# Patient Record
Sex: FEMALE | Race: WHITE | Employment: FULL TIME | URBAN - METROPOLITAN AREA
[De-identification: names, ages, dates, MRNs, and addresses within clinical notes are randomized per-mention and may not be internally consistent; named-entity substitution may affect disease eponyms.]

---

## 2023-11-27 ASSESSMENT — SOCIAL DETERMINANTS OF HEALTH (SDOH)
WITHIN THE LAST YEAR, HAVE YOU BEEN HUMILIATED OR EMOTIONALLY ABUSED IN OTHER WAYS BY YOUR PARTNER OR EX-PARTNER?: NO
WITHIN THE LAST YEAR, HAVE YOU BEEN AFRAID OF YOUR PARTNER OR EX-PARTNER?: NO
WITHIN THE LAST YEAR, HAVE TO BEEN RAPED OR FORCED TO HAVE ANY KIND OF SEXUAL ACTIVITY BY YOUR PARTNER OR EX-PARTNER?: NO
WITHIN THE LAST YEAR, HAVE YOU BEEN KICKED, HIT, SLAPPED, OR OTHERWISE PHYSICALLY HURT BY YOUR PARTNER OR EX-PARTNER?: NO

## 2023-11-29 ENCOUNTER — HOSPITAL ENCOUNTER (OUTPATIENT)
Age: 38
Setting detail: SPECIMEN
Discharge: HOME | End: 2023-11-29

## 2023-11-29 ENCOUNTER — OFFICE VISIT (OUTPATIENT)
Dept: OBGYN CLINIC | Age: 38
End: 2023-11-29
Payer: MEDICAID

## 2023-11-29 VITALS
WEIGHT: 196.8 LBS | SYSTOLIC BLOOD PRESSURE: 120 MMHG | DIASTOLIC BLOOD PRESSURE: 70 MMHG | BODY MASS INDEX: 32.79 KG/M2 | HEIGHT: 65 IN

## 2023-11-29 DIAGNOSIS — N93.9 ABNORMAL UTERINE AND VAGINAL BLEEDING, UNSPECIFIED: ICD-10-CM

## 2023-11-29 DIAGNOSIS — N93.9 ABNORMAL UTERINE AND VAGINAL BLEEDING, UNSPECIFIED: Primary | ICD-10-CM

## 2023-11-29 LAB
BASOPHILS # BLD: 0.06 K/UL (ref 0–0.2)
BASOPHILS NFR BLD: 1 % (ref 0–2)
EOSINOPHIL # BLD: 0.13 K/UL (ref 0–0.44)
EOSINOPHILS RELATIVE PERCENT: 2 % (ref 1–4)
ERYTHROCYTE [DISTWIDTH] IN BLOOD BY AUTOMATED COUNT: 12.2 % (ref 11.8–14.4)
HCT VFR BLD AUTO: 36.6 % (ref 36.3–47.1)
HGB BLD-MCNC: 11.7 G/DL (ref 11.9–15.1)
IMM GRANULOCYTES # BLD AUTO: <0.03 K/UL (ref 0–0.3)
IMM GRANULOCYTES NFR BLD: 0 %
IRON SATN MFR SERPL: 15 % (ref 20–55)
IRON SERPL-MCNC: 50 UG/DL (ref 37–145)
LYMPHOCYTES NFR BLD: 1.56 K/UL (ref 1.1–3.7)
LYMPHOCYTES RELATIVE PERCENT: 20 % (ref 24–43)
MCH RBC QN AUTO: 31.1 PG (ref 25.2–33.5)
MCHC RBC AUTO-ENTMCNC: 32 G/DL (ref 28.4–34.8)
MCV RBC AUTO: 97.3 FL (ref 82.6–102.9)
MONOCYTES NFR BLD: 0.66 K/UL (ref 0.1–1.2)
MONOCYTES NFR BLD: 9 % (ref 3–12)
NEUTROPHILS NFR BLD: 68 % (ref 36–65)
NEUTS SEG NFR BLD: 5.38 K/UL (ref 1.5–8.1)
NRBC BLD-RTO: 0 PER 100 WBC
PLATELET # BLD AUTO: 482 K/UL (ref 138–453)
PMV BLD AUTO: 9.5 FL (ref 8.1–13.5)
RBC # BLD AUTO: 3.76 M/UL (ref 3.95–5.11)
T4 FREE SERPL-MCNC: 1.1 NG/DL (ref 0.9–1.7)
TIBC SERPL-MCNC: 341 UG/DL (ref 250–450)
TSH SERPL DL<=0.05 MIU/L-ACNC: 1.27 UIU/ML (ref 0.3–5)
UNSATURATED IRON BINDING CAPACITY: 291 UG/DL (ref 112–347)
WBC OTHER # BLD: 7.8 K/UL (ref 3.5–11.3)

## 2023-11-29 PROCEDURE — 99204 OFFICE O/P NEW MOD 45 MIN: CPT | Performed by: NURSE PRACTITIONER

## 2023-11-29 RX ORDER — ESCITALOPRAM OXALATE 20 MG/1
TABLET ORAL
COMMUNITY
Start: 2023-11-22

## 2023-11-29 RX ORDER — LOSARTAN POTASSIUM 100 MG/1
TABLET ORAL
COMMUNITY
Start: 2023-11-27

## 2023-11-29 RX ORDER — BUPROPION HYDROCHLORIDE 300 MG/1
TABLET ORAL
COMMUNITY
Start: 2023-11-01

## 2023-11-29 RX ORDER — TOPIRAMATE 100 MG/1
TABLET, FILM COATED ORAL
COMMUNITY
Start: 2023-11-27

## 2023-11-29 RX ORDER — CETIRIZINE HYDROCHLORIDE 10 MG/1
1 TABLET ORAL DAILY
COMMUNITY
Start: 2022-11-07

## 2023-11-29 NOTE — PROGRESS NOTES
PROBLEM VISIT     Date of service: 2023    Desiree Novoa Every  Is a 34 y.o. female    PT's PCP is: Dora Alva DO     : 1985                                             Subjective:       Patient's last menstrual period was 2023. OB History   No obstetric history on file. Social History     Tobacco Use   Smoking Status Never   Smokeless Tobacco Not on file        Social History     Substance and Sexual Activity   Alcohol Use Yes    Comment: very little        Allergies: Latex, Influenza vaccines, Other, and Tetanus toxoids      Current Outpatient Medications:     tranexamic acid (LYSTEDA) 650 MG TABS tablet, Take 2 tablets by mouth 3 times daily, Disp: 18 tablet, Rfl: 0    cetirizine (ZYRTEC) 10 MG tablet, Take 1 tablet by mouth daily, Disp: , Rfl:     PHENTERMINE HCL PO, , Disp: , Rfl:     topiramate (TOPAMAX) 100 MG tablet, , Disp: , Rfl:     losartan (COZAAR) 100 MG tablet, , Disp: , Rfl:     escitalopram (LEXAPRO) 20 MG tablet, , Disp: , Rfl:     buPROPion (WELLBUTRIN XL) 300 MG extended release tablet, , Disp: , Rfl:     Social History     Substance and Sexual Activity   Sexual Activity Yes    Partners: Female         Chief Complaint   Patient presents with    Menstrual Problem     Reports bleeding since  and most days are heavy with clotting. Reports some clots are small and some are golf ball size or larger. Pelvic Pain     Reports increased cramping with this cycle. PE:  Vital Signs  Blood pressure 120/70, height 1.651 m (5' 5\"), weight 89.3 kg (196 lb 12.8 oz), last menstrual period 2023. HPI: Patient presents today with complaints of AUB. Reports onset of vaginal bleeding on 23 which was normal timing for LMP. Bleeding has persisted and been heavy in amount with clotting. Prior to AUB menses were monthly lasting appropriate duration. Denies any recent medication changes; down 30 pounds since May with Adipex.  Has gastric bypass 5 years ago

## 2023-11-30 RX ORDER — TRANEXAMIC ACID 650 MG/1
1300 TABLET ORAL 3 TIMES DAILY
Qty: 18 TABLET | Refills: 0 | Status: SHIPPED | OUTPATIENT
Start: 2023-11-30

## 2023-12-06 ASSESSMENT — ENCOUNTER SYMPTOMS
DIARRHEA: 0
CONSTIPATION: 0
SHORTNESS OF BREATH: 0
ABDOMINAL PAIN: 0

## 2023-12-07 ENCOUNTER — OFFICE VISIT (OUTPATIENT)
Dept: OBGYN CLINIC | Age: 38
End: 2023-12-07
Payer: MEDICAID

## 2023-12-07 VITALS — SYSTOLIC BLOOD PRESSURE: 124 MMHG | BODY MASS INDEX: 32.78 KG/M2 | WEIGHT: 197 LBS | DIASTOLIC BLOOD PRESSURE: 78 MMHG

## 2023-12-07 DIAGNOSIS — N93.9 ABNORMAL UTERINE AND VAGINAL BLEEDING, UNSPECIFIED: Primary | ICD-10-CM

## 2023-12-07 DIAGNOSIS — D25.9 UTERINE LEIOMYOMA, UNSPECIFIED LOCATION: ICD-10-CM

## 2023-12-07 DIAGNOSIS — N84.0 ENDOMETRIAL POLYP: ICD-10-CM

## 2023-12-07 DIAGNOSIS — N80.03 ADENOMYOSIS: ICD-10-CM

## 2023-12-07 PROCEDURE — 99213 OFFICE O/P EST LOW 20 MIN: CPT | Performed by: NURSE PRACTITIONER

## 2023-12-07 NOTE — PROGRESS NOTES
PROBLEM VISIT     Date of service: 2023    Pam Escobedo  Is a 34 y.o. female    PT's PCP is: Matt Dewitt DO     : 1985                                             Subjective:       Patient's last menstrual period was 2023. OB History   No obstetric history on file. Social History     Tobacco Use   Smoking Status Never   Smokeless Tobacco Not on file        Social History     Substance and Sexual Activity   Alcohol Use Yes    Comment: very little        Allergies: Latex, Influenza vaccines, Other, and Tetanus toxoids      Current Outpatient Medications:     tranexamic acid (LYSTEDA) 650 MG TABS tablet, Take 2 tablets by mouth 3 times daily, Disp: 18 tablet, Rfl: 0    topiramate (TOPAMAX) 100 MG tablet, , Disp: , Rfl:     losartan (COZAAR) 100 MG tablet, , Disp: , Rfl:     escitalopram (LEXAPRO) 20 MG tablet, , Disp: , Rfl:     buPROPion (WELLBUTRIN XL) 300 MG extended release tablet, , Disp: , Rfl:     cetirizine (ZYRTEC) 10 MG tablet, Take 1 tablet by mouth daily, Disp: , Rfl:     PHENTERMINE HCL PO, , Disp: , Rfl:     Social History     Substance and Sexual Activity   Sexual Activity Yes    Partners: Female         Chief Complaint   Patient presents with    Follow-up     Follow up usn for AUB. Stopped bleeding yesterday. PE:  Vital Signs  Blood pressure 124/78, weight 89.4 kg (197 lb), last menstrual period 2023. HPI: Patient presents today following ultrasound for AUB. Patient presents today with complaints of AUB. Reports onset of vaginal bleeding on 23 which was normal timing for LMP. Bleeding has persisted and been heavy in amount with clotting. Prior to AUB menses were monthly lasting appropriate duration. Bleeding lightened with Lysteda but did not stop until yesterday . PT denies fever, chills, nausea and vomiting       Review of Systems   Constitutional: Negative. Respiratory:  Negative for chest tightness and shortness of breath.

## 2023-12-13 ASSESSMENT — ENCOUNTER SYMPTOMS
ABDOMINAL PAIN: 0
CHEST TIGHTNESS: 0
SHORTNESS OF BREATH: 0

## 2023-12-30 DIAGNOSIS — N93.9 ABNORMAL UTERINE AND VAGINAL BLEEDING, UNSPECIFIED: ICD-10-CM

## 2024-01-02 RX ORDER — TRANEXAMIC ACID 650 MG/1
1300 TABLET ORAL 3 TIMES DAILY
Qty: 18 TABLET | Refills: 0 | Status: SHIPPED | OUTPATIENT
Start: 2024-01-02

## 2024-02-12 NOTE — PROGRESS NOTES
Patient instructed on the pre-operative, intra-operative, and post-operative process. Patient instructed on NPO status. Medication instructions and pre operative instruction sheet reviewed with the patient. CHG skin prep instructions reviewed with patient. Instructed pt to stop taking phentermine today and to take topamax, wellbutrin, lexapro, and zyrtec with a small sip of water prior to arriving to the hospital the day of surgery.

## 2024-02-21 ENCOUNTER — HOSPITAL ENCOUNTER (OUTPATIENT)
Age: 39
Setting detail: SPECIMEN
Discharge: HOME | End: 2024-02-21

## 2024-02-21 ENCOUNTER — OFFICE VISIT (OUTPATIENT)
Dept: OBGYN CLINIC | Age: 39
End: 2024-02-21
Payer: MEDICAID

## 2024-02-21 VITALS — DIASTOLIC BLOOD PRESSURE: 82 MMHG | SYSTOLIC BLOOD PRESSURE: 132 MMHG | WEIGHT: 199 LBS | BODY MASS INDEX: 33.12 KG/M2

## 2024-02-21 DIAGNOSIS — N80.03 ADENOMYOSIS: ICD-10-CM

## 2024-02-21 DIAGNOSIS — D21.9 FIBROIDS: ICD-10-CM

## 2024-02-21 DIAGNOSIS — N84.0 POLYP OF CORPUS UTERI: ICD-10-CM

## 2024-02-21 DIAGNOSIS — N84.0 ENDOMETRIAL POLYP: ICD-10-CM

## 2024-02-21 DIAGNOSIS — Z01.812 ENCOUNTER FOR PRE-OPERATIVE LABORATORY TESTING: ICD-10-CM

## 2024-02-21 DIAGNOSIS — D25.9 UTERINE LEIOMYOMA, UNSPECIFIED LOCATION: ICD-10-CM

## 2024-02-21 DIAGNOSIS — N93.9 ABNORMAL UTERINE AND VAGINAL BLEEDING, UNSPECIFIED: ICD-10-CM

## 2024-02-21 DIAGNOSIS — I10 ESSENTIAL HYPERTENSION, BENIGN: ICD-10-CM

## 2024-02-21 DIAGNOSIS — N92.1 MENORRHAGIA WITH IRREGULAR CYCLE: Primary | ICD-10-CM

## 2024-02-21 LAB
ALBUMIN SERPL-MCNC: 4 G/DL (ref 3.5–5.2)
ALBUMIN/GLOB SERPL: 2 {RATIO} (ref 1–2.5)
ALP SERPL-CCNC: 84 U/L (ref 35–104)
ALT SERPL-CCNC: 12 U/L (ref 10–35)
ANION GAP SERPL CALCULATED.3IONS-SCNC: 9 MMOL/L (ref 9–16)
AST SERPL-CCNC: 22 U/L (ref 10–35)
BASOPHILS # BLD: 0.07 K/UL (ref 0–0.2)
BASOPHILS NFR BLD: 1 % (ref 0–2)
BILIRUB SERPL-MCNC: 0.3 MG/DL (ref 0–1.2)
BUN SERPL-MCNC: 13 MG/DL (ref 6–20)
CALCIUM SERPL-MCNC: 9 MG/DL (ref 8.6–10.4)
CHLORIDE SERPL-SCNC: 106 MMOL/L (ref 98–107)
CO2 SERPL-SCNC: 24 MMOL/L (ref 20–31)
CREAT SERPL-MCNC: 0.8 MG/DL (ref 0.5–0.9)
EOSINOPHIL # BLD: 0.25 K/UL (ref 0–0.44)
EOSINOPHILS RELATIVE PERCENT: 4 % (ref 1–4)
ERYTHROCYTE [DISTWIDTH] IN BLOOD BY AUTOMATED COUNT: 12.2 % (ref 11.8–14.4)
GFR SERPL CREATININE-BSD FRML MDRD: >60 ML/MIN/1.73M2
GLUCOSE SERPL-MCNC: 98 MG/DL (ref 74–99)
HCG SERPL QL: NEGATIVE
HCT VFR BLD AUTO: 31.1 % (ref 36.3–47.1)
HGB BLD-MCNC: 9.2 G/DL (ref 11.9–15.1)
IMM GRANULOCYTES # BLD AUTO: <0.03 K/UL (ref 0–0.3)
IMM GRANULOCYTES NFR BLD: 0 %
LYMPHOCYTES NFR BLD: 1.6 K/UL (ref 1.1–3.7)
LYMPHOCYTES RELATIVE PERCENT: 23 % (ref 24–43)
MCH RBC QN AUTO: 27.1 PG (ref 25.2–33.5)
MCHC RBC AUTO-ENTMCNC: 29.6 G/DL (ref 28.4–34.8)
MCV RBC AUTO: 91.5 FL (ref 82.6–102.9)
MONOCYTES NFR BLD: 0.65 K/UL (ref 0.1–1.2)
MONOCYTES NFR BLD: 9 % (ref 3–12)
NEUTROPHILS NFR BLD: 63 % (ref 36–65)
NEUTS SEG NFR BLD: 4.45 K/UL (ref 1.5–8.1)
NRBC BLD-RTO: 0 PER 100 WBC
PLATELET # BLD AUTO: 513 K/UL (ref 138–453)
PMV BLD AUTO: 9.8 FL (ref 8.1–13.5)
POTASSIUM SERPL-SCNC: 3.9 MMOL/L (ref 3.7–5.3)
PROT SERPL-MCNC: 6.5 G/DL (ref 6.6–8.7)
RBC # BLD AUTO: 3.4 M/UL (ref 3.95–5.11)
SODIUM SERPL-SCNC: 139 MMOL/L (ref 136–145)
WBC OTHER # BLD: 7 K/UL (ref 3.5–11.3)

## 2024-02-21 PROCEDURE — 99213 OFFICE O/P EST LOW 20 MIN: CPT | Performed by: OBSTETRICS & GYNECOLOGY

## 2024-02-21 NOTE — PROGRESS NOTES
DATE OF VISIT:  2/21/24    PATIENT NAME:  Desiree Garcia     YOB: 1985    REASON FOR VISIT:    Chief Complaint   Patient presents with    Menorrhagia     Adenomyosis, endometrial polyp, fibroids. Patient was originally scheduled for annual today, she reports she just had one at [facility] in 7- with a nl pap at that time.  Patient reports current heavy bleeding. She is saturating a pad every 1-1/2 hrs.  Passing quarter sized clots.  She does have a lysteda rx at home that she used once in the past. Cycles have been irregular since oct/nov.        HISTORY OF PRESENT ILLNESS:  Pt with complaint of heavy and prolonged menses; abnl bleeding started in November and she bled almost daily until appt in dec; lysteda slowed the bleeding but didn't stopped it - she only used once since December; she has had gastric bypass and has had anemia; usn showed: Heterogeneous appearing retroverted uterus measures: 8.1 x 6.4  x 4.9 cm; Endometrium measures: 8.5 mm; Hyperechoic area with a feeding vessel is seen within the endometrial canal milena: 1.1 x 0.6 x 0.9 cm; Multiple fibroids seen; largest three milena:   #1: Right uterus milena: 2.1 x 1.9 x 1.9 cm,     #2: Near uterine fundus milena: 1.6 x 1.5 x 1.8 cm,  #3: Left uterus milena: 1.4 x 1.0 x 1.4 cm; pt interested in definitive surgical intervention; disc'd ra tlh/poss bso; r/b/a reviewed; restrictions and recovery disc'd          Patient's last menstrual period was 02/20/2024 (exact date).  Vitals:    02/21/24 1028   BP: 132/82   Site: Right Upper Arm   Position: Sitting   Weight: 90.3 kg (199 lb)     Body mass index is 33.12 kg/m².  Allergies   Allergen Reactions    Latex     Influenza Vaccines     Other      \"Flu Shot\"    Tetanus Toxoids      Current Outpatient Medications   Medication Sig Dispense Refill    topiramate (TOPAMAX) 100 MG tablet       losartan (COZAAR) 100 MG tablet       escitalopram (LEXAPRO) 20 MG tablet       buPROPion (WELLBUTRIN XL) 300

## 2024-02-21 NOTE — H&P (VIEW-ONLY)
DATE OF VISIT:  2/21/24    PATIENT NAME:  Desiree Garcia     YOB: 1985    REASON FOR VISIT:    Chief Complaint   Patient presents with    Menorrhagia     Adenomyosis, endometrial polyp, fibroids. Patient was originally scheduled for annual today, she reports she just had one at [facility] in 7- with a nl pap at that time.  Patient reports current heavy bleeding. She is saturating a pad every 1-1/2 hrs.  Passing quarter sized clots.  She does have a lysteda rx at home that she used once in the past. Cycles have been irregular since oct/nov.        HISTORY OF PRESENT ILLNESS:  Pt with complaint of heavy and prolonged menses; abnl bleeding started in November and she bled almost daily until appt in dec; lysteda slowed the bleeding but didn't stopped it - she only used once since December; she has had gastric bypass and has had anemia; usn showed: Heterogeneous appearing retroverted uterus measures: 8.1 x 6.4  x 4.9 cm; Endometrium measures: 8.5 mm; Hyperechoic area with a feeding vessel is seen within the endometrial canal milena: 1.1 x 0.6 x 0.9 cm; Multiple fibroids seen; largest three milena:   #1: Right uterus milena: 2.1 x 1.9 x 1.9 cm,     #2: Near uterine fundus milena: 1.6 x 1.5 x 1.8 cm,  #3: Left uterus milena: 1.4 x 1.0 x 1.4 cm; pt interested in definitive surgical intervention; disc'd ra tlh/poss bso; r/b/a reviewed; restrictions and recovery disc'd          Patient's last menstrual period was 02/20/2024 (exact date).  Vitals:    02/21/24 1028   BP: 132/82   Site: Right Upper Arm   Position: Sitting   Weight: 90.3 kg (199 lb)     Body mass index is 33.12 kg/m².  Allergies   Allergen Reactions    Latex     Influenza Vaccines     Other      \"Flu Shot\"    Tetanus Toxoids      Current Outpatient Medications   Medication Sig Dispense Refill    topiramate (TOPAMAX) 100 MG tablet       losartan (COZAAR) 100 MG tablet       escitalopram (LEXAPRO) 20 MG tablet       buPROPion (WELLBUTRIN XL) 300

## 2024-02-22 RX ORDER — FERROUS SULFATE 325(65) MG
325 TABLET ORAL 2 TIMES DAILY
Qty: 180 TABLET | Refills: 1 | Status: SHIPPED | OUTPATIENT
Start: 2024-02-22

## 2024-02-23 ENCOUNTER — ANESTHESIA EVENT (OUTPATIENT)
Dept: OPERATING ROOM | Age: 39
End: 2024-02-23
Payer: MEDICAID

## 2024-02-26 ENCOUNTER — ANESTHESIA (OUTPATIENT)
Dept: OPERATING ROOM | Age: 39
End: 2024-02-26
Payer: MEDICAID

## 2024-02-26 ENCOUNTER — HOSPITAL ENCOUNTER (OUTPATIENT)
Age: 39
Setting detail: OUTPATIENT SURGERY
Discharge: HOME | End: 2024-02-26
Attending: OBSTETRICS & GYNECOLOGY | Admitting: OBSTETRICS & GYNECOLOGY
Payer: MEDICAID

## 2024-02-26 VITALS
DIASTOLIC BLOOD PRESSURE: 76 MMHG | HEIGHT: 65 IN | BODY MASS INDEX: 33.26 KG/M2 | HEART RATE: 70 BPM | TEMPERATURE: 97 F | OXYGEN SATURATION: 97 % | SYSTOLIC BLOOD PRESSURE: 122 MMHG | WEIGHT: 199.6 LBS | RESPIRATION RATE: 20 BRPM

## 2024-02-26 DIAGNOSIS — N84.0 ENDOMETRIAL POLYP: ICD-10-CM

## 2024-02-26 DIAGNOSIS — N80.03 ADENOMYOSIS: ICD-10-CM

## 2024-02-26 DIAGNOSIS — G89.18 POSTOPERATIVE PAIN: Primary | ICD-10-CM

## 2024-02-26 PROBLEM — N92.1 EXCESSIVE AND FREQUENT MENSTRUATION WITH IRREGULAR CYCLE: Status: ACTIVE | Noted: 2024-02-26

## 2024-02-26 PROBLEM — D25.9 UTERINE LEIOMYOMA: Status: ACTIVE | Noted: 2024-02-26

## 2024-02-26 PROCEDURE — 6360000002 HC RX W HCPCS

## 2024-02-26 PROCEDURE — 7100000001 HC PACU RECOVERY - ADDTL 15 MIN: Performed by: OBSTETRICS & GYNECOLOGY

## 2024-02-26 PROCEDURE — 64488 TAP BLOCK BI INJECTION: CPT | Performed by: NURSE ANESTHETIST, CERTIFIED REGISTERED

## 2024-02-26 PROCEDURE — 7100000000 HC PACU RECOVERY - FIRST 15 MIN: Performed by: OBSTETRICS & GYNECOLOGY

## 2024-02-26 PROCEDURE — 58571 TLH W/T/O 250 G OR LESS: CPT | Performed by: OBSTETRICS & GYNECOLOGY

## 2024-02-26 PROCEDURE — 2580000003 HC RX 258: Performed by: NURSE ANESTHETIST, CERTIFIED REGISTERED

## 2024-02-26 PROCEDURE — 3600000009 HC SURGERY ROBOT BASE: Performed by: OBSTETRICS & GYNECOLOGY

## 2024-02-26 PROCEDURE — 88307 TISSUE EXAM BY PATHOLOGIST: CPT

## 2024-02-26 PROCEDURE — A4216 STERILE WATER/SALINE, 10 ML: HCPCS | Performed by: NURSE ANESTHETIST, CERTIFIED REGISTERED

## 2024-02-26 PROCEDURE — 7100000011 HC PHASE II RECOVERY - ADDTL 15 MIN: Performed by: OBSTETRICS & GYNECOLOGY

## 2024-02-26 PROCEDURE — 2500000003 HC RX 250 WO HCPCS: Performed by: NURSE ANESTHETIST, CERTIFIED REGISTERED

## 2024-02-26 PROCEDURE — 6360000002 HC RX W HCPCS: Performed by: NURSE ANESTHETIST, CERTIFIED REGISTERED

## 2024-02-26 PROCEDURE — 2709999900 HC NON-CHARGEABLE SUPPLY: Performed by: OBSTETRICS & GYNECOLOGY

## 2024-02-26 PROCEDURE — 3700000000 HC ANESTHESIA ATTENDED CARE: Performed by: OBSTETRICS & GYNECOLOGY

## 2024-02-26 PROCEDURE — 3700000001 HC ADD 15 MINUTES (ANESTHESIA): Performed by: OBSTETRICS & GYNECOLOGY

## 2024-02-26 PROCEDURE — 7100000010 HC PHASE II RECOVERY - FIRST 15 MIN: Performed by: OBSTETRICS & GYNECOLOGY

## 2024-02-26 PROCEDURE — 3600000019 HC SURGERY ROBOT ADDTL 15MIN: Performed by: OBSTETRICS & GYNECOLOGY

## 2024-02-26 PROCEDURE — 58571 TLH W/T/O 250 G OR LESS: CPT

## 2024-02-26 PROCEDURE — 6370000000 HC RX 637 (ALT 250 FOR IP): Performed by: NURSE ANESTHETIST, CERTIFIED REGISTERED

## 2024-02-26 PROCEDURE — 6370000000 HC RX 637 (ALT 250 FOR IP)

## 2024-02-26 PROCEDURE — S2900 ROBOTIC SURGICAL SYSTEM: HCPCS | Performed by: OBSTETRICS & GYNECOLOGY

## 2024-02-26 RX ORDER — DIMENHYDRINATE 50 MG
50 TABLET ORAL ONCE
Status: COMPLETED | OUTPATIENT
Start: 2024-02-26 | End: 2024-02-26

## 2024-02-26 RX ORDER — SODIUM CHLORIDE 9 MG/ML
INJECTION, SOLUTION INTRAVENOUS PRN
Status: DISCONTINUED | OUTPATIENT
Start: 2024-02-26 | End: 2024-02-26 | Stop reason: HOSPADM

## 2024-02-26 RX ORDER — SODIUM CHLORIDE, SODIUM LACTATE, POTASSIUM CHLORIDE, CALCIUM CHLORIDE 600; 310; 30; 20 MG/100ML; MG/100ML; MG/100ML; MG/100ML
INJECTION, SOLUTION INTRAVENOUS
Status: COMPLETED | OUTPATIENT
Start: 2024-02-26 | End: 2024-02-26

## 2024-02-26 RX ORDER — ROCURONIUM BROMIDE 10 MG/ML
INJECTION, SOLUTION INTRAVENOUS PRN
Status: DISCONTINUED | OUTPATIENT
Start: 2024-02-26 | End: 2024-02-26 | Stop reason: SDUPTHER

## 2024-02-26 RX ORDER — DEXAMETHASONE SODIUM PHOSPHATE 10 MG/ML
INJECTION, SOLUTION INTRAMUSCULAR; INTRAVENOUS PRN
Status: DISCONTINUED | OUTPATIENT
Start: 2024-02-26 | End: 2024-02-26 | Stop reason: SDUPTHER

## 2024-02-26 RX ORDER — HYDROCODONE BITARTRATE AND ACETAMINOPHEN 5; 325 MG/1; MG/1
1 TABLET ORAL EVERY 6 HOURS PRN
Qty: 10 TABLET | Refills: 0 | Status: SHIPPED | OUTPATIENT
Start: 2024-02-26 | End: 2024-03-02

## 2024-02-26 RX ORDER — KETOROLAC TROMETHAMINE 10 MG/1
10 TABLET, FILM COATED ORAL EVERY 6 HOURS PRN
Qty: 20 TABLET | Refills: 0 | Status: SHIPPED | OUTPATIENT
Start: 2024-02-26 | End: 2025-02-25

## 2024-02-26 RX ORDER — NEOSTIGMINE METHYLSULFATE 1 MG/ML
INJECTION, SOLUTION INTRAVENOUS PRN
Status: DISCONTINUED | OUTPATIENT
Start: 2024-02-26 | End: 2024-02-26 | Stop reason: SDUPTHER

## 2024-02-26 RX ORDER — GLYCOPYRROLATE 0.2 MG/ML
INJECTION INTRAMUSCULAR; INTRAVENOUS PRN
Status: DISCONTINUED | OUTPATIENT
Start: 2024-02-26 | End: 2024-02-26 | Stop reason: SDUPTHER

## 2024-02-26 RX ORDER — SODIUM CHLORIDE, SODIUM LACTATE, POTASSIUM CHLORIDE, CALCIUM CHLORIDE 600; 310; 30; 20 MG/100ML; MG/100ML; MG/100ML; MG/100ML
INJECTION, SOLUTION INTRAVENOUS CONTINUOUS
Status: DISCONTINUED | OUTPATIENT
Start: 2024-02-26 | End: 2024-02-26 | Stop reason: HOSPADM

## 2024-02-26 RX ORDER — ACETAMINOPHEN 325 MG/1
650 TABLET ORAL ONCE
Status: COMPLETED | OUTPATIENT
Start: 2024-02-26 | End: 2024-02-26

## 2024-02-26 RX ORDER — ONDANSETRON 2 MG/ML
4 INJECTION INTRAMUSCULAR; INTRAVENOUS
Status: DISCONTINUED | OUTPATIENT
Start: 2024-02-26 | End: 2024-02-26 | Stop reason: HOSPADM

## 2024-02-26 RX ORDER — PROPOFOL 10 MG/ML
INJECTION, EMULSION INTRAVENOUS PRN
Status: DISCONTINUED | OUTPATIENT
Start: 2024-02-26 | End: 2024-02-26 | Stop reason: SDUPTHER

## 2024-02-26 RX ORDER — SODIUM CHLORIDE 0.9 % (FLUSH) 0.9 %
5-40 SYRINGE (ML) INJECTION PRN
Status: DISCONTINUED | OUTPATIENT
Start: 2024-02-26 | End: 2024-02-26 | Stop reason: HOSPADM

## 2024-02-26 RX ORDER — LIDOCAINE HYDROCHLORIDE 20 MG/ML
INJECTION, SOLUTION EPIDURAL; INFILTRATION; INTRACAUDAL; PERINEURAL PRN
Status: DISCONTINUED | OUTPATIENT
Start: 2024-02-26 | End: 2024-02-26 | Stop reason: SDUPTHER

## 2024-02-26 RX ORDER — ONDANSETRON 2 MG/ML
INJECTION INTRAMUSCULAR; INTRAVENOUS PRN
Status: DISCONTINUED | OUTPATIENT
Start: 2024-02-26 | End: 2024-02-26 | Stop reason: SDUPTHER

## 2024-02-26 RX ORDER — SODIUM CHLORIDE 0.9 % (FLUSH) 0.9 %
5-40 SYRINGE (ML) INJECTION EVERY 12 HOURS SCHEDULED
Status: DISCONTINUED | OUTPATIENT
Start: 2024-02-26 | End: 2024-02-26 | Stop reason: HOSPADM

## 2024-02-26 RX ORDER — CEFAZOLIN SODIUM IN 0.9 % NACL 2 G/100 ML
2000 PLASTIC BAG, INJECTION (ML) INTRAVENOUS
Status: COMPLETED | OUTPATIENT
Start: 2024-02-26 | End: 2024-02-26

## 2024-02-26 RX ORDER — ENOXAPARIN SODIUM 100 MG/ML
40 INJECTION SUBCUTANEOUS ONCE
Status: COMPLETED | OUTPATIENT
Start: 2024-02-26 | End: 2024-02-26

## 2024-02-26 RX ORDER — KETOROLAC TROMETHAMINE 30 MG/ML
INJECTION, SOLUTION INTRAMUSCULAR; INTRAVENOUS PRN
Status: DISCONTINUED | OUTPATIENT
Start: 2024-02-26 | End: 2024-02-26 | Stop reason: SDUPTHER

## 2024-02-26 RX ORDER — HYDROCODONE BITARTRATE AND ACETAMINOPHEN 5; 325 MG/1; MG/1
1 TABLET ORAL
Status: COMPLETED | OUTPATIENT
Start: 2024-02-26 | End: 2024-02-26

## 2024-02-26 RX ORDER — SODIUM CHLORIDE 9 MG/ML
INJECTION, SOLUTION INTRAVENOUS
Status: DISCONTINUED | OUTPATIENT
Start: 2024-02-26 | End: 2024-02-26 | Stop reason: HOSPADM

## 2024-02-26 RX ORDER — SODIUM CHLORIDE 9 MG/ML
INJECTION, SOLUTION INTRAMUSCULAR; INTRAVENOUS; SUBCUTANEOUS PRN
Status: DISCONTINUED | OUTPATIENT
Start: 2024-02-26 | End: 2024-02-26 | Stop reason: SDUPTHER

## 2024-02-26 RX ORDER — METOCLOPRAMIDE HYDROCHLORIDE 5 MG/ML
10 INJECTION INTRAMUSCULAR; INTRAVENOUS
Status: DISCONTINUED | OUTPATIENT
Start: 2024-02-26 | End: 2024-02-26 | Stop reason: HOSPADM

## 2024-02-26 RX ORDER — MIDAZOLAM HYDROCHLORIDE 1 MG/ML
INJECTION INTRAMUSCULAR; INTRAVENOUS PRN
Status: DISCONTINUED | OUTPATIENT
Start: 2024-02-26 | End: 2024-02-26 | Stop reason: SDUPTHER

## 2024-02-26 RX ORDER — FENTANYL CITRATE 50 UG/ML
INJECTION, SOLUTION INTRAMUSCULAR; INTRAVENOUS PRN
Status: DISCONTINUED | OUTPATIENT
Start: 2024-02-26 | End: 2024-02-26 | Stop reason: SDUPTHER

## 2024-02-26 RX ORDER — ROPIVACAINE HYDROCHLORIDE 5 MG/ML
INJECTION, SOLUTION EPIDURAL; INFILTRATION; PERINEURAL PRN
Status: DISCONTINUED | OUTPATIENT
Start: 2024-02-26 | End: 2024-02-26 | Stop reason: SDUPTHER

## 2024-02-26 RX ADMIN — SODIUM CHLORIDE 30 ML: 9 INJECTION INTRAMUSCULAR; INTRAVENOUS; SUBCUTANEOUS at 08:13

## 2024-02-26 RX ADMIN — SODIUM CHLORIDE, POTASSIUM CHLORIDE, SODIUM LACTATE AND CALCIUM CHLORIDE: 600; 310; 30; 20 INJECTION, SOLUTION INTRAVENOUS at 07:19

## 2024-02-26 RX ADMIN — ONDANSETRON 4 MG: 2 INJECTION INTRAMUSCULAR; INTRAVENOUS at 09:47

## 2024-02-26 RX ADMIN — DIMENHYDRINATE 50 MG: 50 TABLET ORAL at 07:12

## 2024-02-26 RX ADMIN — HYDROCODONE BITARTRATE AND ACETAMINOPHEN 1 TABLET: 5; 325 TABLET ORAL at 11:57

## 2024-02-26 RX ADMIN — KETOROLAC TROMETHAMINE 30 MG: 30 INJECTION, SOLUTION INTRAMUSCULAR; INTRAVENOUS at 09:47

## 2024-02-26 RX ADMIN — PROPOFOL 200 MCG/KG/MIN: 10 INJECTION, EMULSION INTRAVENOUS at 08:41

## 2024-02-26 RX ADMIN — PROPOFOL 180 MG: 10 INJECTION, EMULSION INTRAVENOUS at 08:37

## 2024-02-26 RX ADMIN — ACETAMINOPHEN 650 MG: 325 TABLET ORAL at 07:12

## 2024-02-26 RX ADMIN — NEOSTIGMINE METHYLSULFATE 4 MG: 1 INJECTION, SOLUTION INTRAVENOUS at 09:47

## 2024-02-26 RX ADMIN — Medication 2000 MG: at 08:26

## 2024-02-26 RX ADMIN — ROPIVACAINE HYDROCHLORIDE 60 ML: 5 INJECTION EPIDURAL; INFILTRATION; PERINEURAL at 08:13

## 2024-02-26 RX ADMIN — FENTANYL CITRATE 50 MCG: 50 INJECTION, SOLUTION INTRAMUSCULAR; INTRAVENOUS at 08:42

## 2024-02-26 RX ADMIN — GLYCOPYRROLATE 0.6 MG: 0.2 INJECTION INTRAMUSCULAR; INTRAVENOUS at 09:47

## 2024-02-26 RX ADMIN — ROCURONIUM BROMIDE 40 MG: 10 INJECTION, SOLUTION INTRAVENOUS at 08:37

## 2024-02-26 RX ADMIN — LIDOCAINE HYDROCHLORIDE 100 MG: 20 INJECTION, SOLUTION EPIDURAL; INFILTRATION; INTRACAUDAL; PERINEURAL at 08:37

## 2024-02-26 RX ADMIN — MIDAZOLAM 2 MG: 1 INJECTION INTRAMUSCULAR; INTRAVENOUS at 08:10

## 2024-02-26 RX ADMIN — FENTANYL CITRATE 50 MCG: 50 INJECTION, SOLUTION INTRAMUSCULAR; INTRAVENOUS at 08:46

## 2024-02-26 RX ADMIN — DEXAMETHASONE SODIUM PHOSPHATE 10 MG: 10 INJECTION INTRAMUSCULAR; INTRAVENOUS at 08:13

## 2024-02-26 RX ADMIN — ENOXAPARIN SODIUM 40 MG: 100 INJECTION SUBCUTANEOUS at 07:19

## 2024-02-26 RX ADMIN — SODIUM CHLORIDE, POTASSIUM CHLORIDE, SODIUM LACTATE AND CALCIUM CHLORIDE 500 ML: 600; 310; 30; 20 INJECTION, SOLUTION INTRAVENOUS at 10:05

## 2024-02-26 ASSESSMENT — PAIN DESCRIPTION - DESCRIPTORS
DESCRIPTORS: CRAMPING

## 2024-02-26 ASSESSMENT — PAIN - FUNCTIONAL ASSESSMENT
PAIN_FUNCTIONAL_ASSESSMENT: 0-10
PAIN_FUNCTIONAL_ASSESSMENT: ACTIVITIES ARE NOT PREVENTED
PAIN_FUNCTIONAL_ASSESSMENT: FACE, LEGS, ACTIVITY, CRY, AND CONSOLABILITY (FLACC)
PAIN_FUNCTIONAL_ASSESSMENT: ACTIVITIES ARE NOT PREVENTED
PAIN_FUNCTIONAL_ASSESSMENT: FACE, LEGS, ACTIVITY, CRY, AND CONSOLABILITY (FLACC)
PAIN_FUNCTIONAL_ASSESSMENT: 0-10
PAIN_FUNCTIONAL_ASSESSMENT: ACTIVITIES ARE NOT PREVENTED
PAIN_FUNCTIONAL_ASSESSMENT: 0-10
PAIN_FUNCTIONAL_ASSESSMENT: FACE, LEGS, ACTIVITY, CRY, AND CONSOLABILITY (FLACC)
PAIN_FUNCTIONAL_ASSESSMENT: ACTIVITIES ARE NOT PREVENTED
PAIN_FUNCTIONAL_ASSESSMENT: ACTIVITIES ARE NOT PREVENTED
PAIN_FUNCTIONAL_ASSESSMENT: FACE, LEGS, ACTIVITY, CRY, AND CONSOLABILITY (FLACC)
PAIN_FUNCTIONAL_ASSESSMENT: 0-10
PAIN_FUNCTIONAL_ASSESSMENT: 0-10
PAIN_FUNCTIONAL_ASSESSMENT: FACE, LEGS, ACTIVITY, CRY, AND CONSOLABILITY (FLACC)
PAIN_FUNCTIONAL_ASSESSMENT: FACE, LEGS, ACTIVITY, CRY, AND CONSOLABILITY (FLACC)
PAIN_FUNCTIONAL_ASSESSMENT: 0-10

## 2024-02-26 ASSESSMENT — PAIN DESCRIPTION - ORIENTATION: ORIENTATION: MID;LOWER

## 2024-02-26 ASSESSMENT — PAIN DESCRIPTION - LOCATION: LOCATION: ABDOMEN

## 2024-02-26 ASSESSMENT — PAIN SCALES - GENERAL: PAINLEVEL_OUTOF10: 6

## 2024-02-26 NOTE — ANESTHESIA PROCEDURE NOTES
Peripheral Block    Patient location during procedure: pre-op  Reason for block: post-op pain management and at surgeon's request  Start time: 2/26/2024 8:11 AM  End time: 2/26/2024 8:16 AM  Staffing  Resident/CRNA: Aspen Fontaine APRN - CRNA  Other anesthesia staff: Stephanie Najera APRN - CRNA  Performed by: Aspen Fontaine APRN - CRNA  Authorized by: Aspen Fontaine APRN - CRNA    Preanesthetic Checklist  Completed: patient identified, IV checked, site marked, risks and benefits discussed, surgical/procedural consents, equipment checked, pre-op evaluation, timeout performed, anesthesia consent given, oxygen available, monitors applied/VS acknowledged, fire risk safety assessment completed and verbalized and blood product R/B/A discussed and consented  Peripheral Block   Patient position: supine  Prep: ChloraPrep  Provider prep: mask and sterile gloves  Patient monitoring: continuous pulse ox, IV access and responsive to questions  Block type: TAP and Rectus sheath  Laterality: bilateral  Injection technique: single-shot  Guidance: ultrasound guided    Needle   Needle type: Other   Needle gauge: 22 G  Needle localization: ultrasound guidance  Catheter type: closed end  Test dose: negative  Needle length: 11 cm  Assessment   Injection assessment: negative aspiration for heme, no paresthesia on injection and local visualized surrounding nerve on ultrasound  Paresthesia pain: none  Slow fractionated injection: yes  Hemodynamics: stable  Outcomes: uncomplicated and patient tolerated procedure well

## 2024-02-26 NOTE — PROGRESS NOTES
Discharge instructions given to patient and responsible adult, answered all questions, and verbalized understanding of discharge instructions.         IV Sedation Discharge Criteria    Inpatients must meet Criteria 1 through 7. All other patients are either YES or N/A. If a NO is chosen then Surgeon must be notified.      1.  Minimum 30 minutes after last dose of sedative medication, minimum 120 minutes after last dose of reversal agent.    Yes      2.  Systolic BP stable within 20 mmHg for 30 minutes & systolic BP between 90 & 180 or within 10 mmHg of baseline.    Yes      3.  Pulse between 60 and 100 or within 10 bpm of baseline.    Yes      4.  Spontaneous respiratory rate >/= 10 per minute.    Yes      5.  SaO2 >/= 95 or  >/= baseline.    Yes      6.  Able to cough and swallow or return to baseline function.    Yes      7.  Alert and oriented or return to baseline mental status.    Yes      8.  Demonstrates controlled, coordinated movements, ambulates with steady gait, or return to baseline activity function.    Yes      9.  Minimal or no pain or nausea, or at a level tolerable and acceptable to patient.    Yes      10. Takes and retains oral fluids as allowed.    Yes      11. Procedural / perioperative site stable.  Minimal or no bleeding.    Yes          12. If GI endoscopy procedure, minimal or no abdominal distention or passing flatus.    N/A      13. Written discharge instructions and emergency telephone number provided.    Yes      14. Accompanied by a responsible adult.    Yes

## 2024-02-26 NOTE — ANESTHESIA PRE PROCEDURE
hypertension:                  Neuro/Psych:   (+) neuromuscular disease:, headaches: migraine headaches, psychiatric history: stable with treatment             ROS comment: [facility] [place] GI/Hepatic/Renal: Neg GI/Hepatic/Renal ROS            Endo/Other: Negative Endo/Other ROS                    Abdominal: normal exam            Vascular: negative vascular ROS.         Other Findings:         Anesthesia Plan      general     ASA 2       Induction: intravenous.    MIPS: Postoperative opioids intended and Prophylactic antiemetics administered.  Anesthetic plan and risks discussed with patient.              Post-op pain plan if not by surgeon: single peripheral nerve block        ANTWON Beckham - CRNA   2/26/2024

## 2024-02-26 NOTE — OP NOTE
Preoperative diagnosis: 1.  Menorrhagia  2.  Adenomyosis  3.  Uterine fibroid    Postoperative diagnosis: Same +endometriosis    Procedure:  Robotic-assisted Total Laparoscopic Hysterectomy with Bilateral Salpingectomy    Surgeon: Dr. Velia Machuca    Asst.: Valeri Ribera PGY2    Anesthesia: Gen.    Estimated blood loss: 25 mL    Fluids: LR    Urine: 100 mL of clear urine    Condition: Stable    Complications: None    Findings: The patient had an anteverted uterus which sounded to 8 cm in depth.  The tubes and ovaries were grossly within normal limits.  There was endometriosis and scarring to the bilateral uterosacral ligaments.  Bilateral ureteral peristalsis was noted throughout the case and after closure of the vaginal cuff.    Specimen removed: Uterus and Bilateral tubes    Operative note: The patient was taken to the operating room where general anesthesia was obtained without difficulty.  She was prepped and draped usual sterile fashion in a dorsal lithotomy position.  Timeout was performed.  A weighted speculum was placed in the patient's vagina and the anterior lip of the cervix was grasped with a single-tooth tenaculum.  The cervix was progressively dilated and the uterus was sounded with the findings noted above.  A V care uterine manipulator was then placed.  A Church catheter was placed and left to gravity drainage.  At this point attention was turned to the patient's abdomen where a supraumbilical incision was made with a scalpel.  An 8 mm skin incision was made.  A veress needle was then carefully introduced at a 45° angle while tenting the abdominal wall.  Intraabdominal placement was confirmed by use of water-filled syringe and drop in intra-abdominal pressure with insufflation of CO2.  Pneumoperitoneum was obtained with 2.5 liters of CO2.  An 8 mm robotic port was then placed without difficulty and intra-abdominal placement was confirmed by laparoscopy.  Second

## 2024-02-26 NOTE — ANESTHESIA POSTPROCEDURE EVALUATION
Department of Anesthesiology  Postprocedure Note    Patient: Desiree Garcia  MRN: [id number]  YOB: 1985  Date of evaluation: 2/26/2024    Procedure Summary       Date: 02/26/24 Room / Location: [address]    Anesthesia Start: 0828 Anesthesia Stop: 1007    Procedure: HYSTERECTOMY VAGINAL LAPAROSCOPIC ROBOTIC ASSISTED- Bilateral salpingectomy (Abdomen/Perineum) Diagnosis:       Adenomyosis      Endometrial polyp      (Adenomyosis [N80.03])      (Endometrial polyp [N84.0])    Surgeons: Velia Salvador DO Responsible Provider: Aspen Fontaine APRN - CRNA    Anesthesia Type: general ASA Status: 2            Anesthesia Type: No value filed.    Majo Phase I: Majo Score: 9    Majo Phase II: Majo Score: 10    Anesthesia Post Evaluation    Patient location during evaluation: PACU  Patient participation: complete - patient participated  Level of consciousness: awake and alert  Pain score: 3  Airway patency: patent  Nausea & Vomiting: no nausea and no vomiting  Cardiovascular status: blood pressure returned to baseline  Respiratory status: acceptable, room air and spontaneous ventilation  Hydration status: stable  Multimodal analgesia pain management approach  Pain management: adequate and satisfactory to patient        No notable events documented.

## 2024-02-26 NOTE — PROGRESS NOTES
[id number] Patient assisted to and from bathroom, gait steady with assist. Patient unsuccessful with void attempt, states \"It feels like it's right there.\"

## 2024-02-26 NOTE — PROGRESS NOTES
Patient very drowsy post-op. Pt will open eyes briefly and answer questions. Will move patient to phase 2 to remain on the cart.

## 2024-02-26 NOTE — PROGRESS NOTES
Patient verbalizes readiness for discharge. Patient getting dressed with the help of significant other.

## 2024-02-27 LAB — SURGICAL PATHOLOGY REPORT: NORMAL

## 2024-03-07 ENCOUNTER — OFFICE VISIT (OUTPATIENT)
Dept: OBGYN CLINIC | Age: 39
End: 2024-03-07
Payer: MEDICAID

## 2024-03-07 VITALS — DIASTOLIC BLOOD PRESSURE: 74 MMHG | WEIGHT: 197 LBS | SYSTOLIC BLOOD PRESSURE: 112 MMHG | BODY MASS INDEX: 32.78 KG/M2

## 2024-03-07 DIAGNOSIS — R10.2 PELVIC PAIN: Primary | ICD-10-CM

## 2024-03-07 DIAGNOSIS — R35.0 URINARY FREQUENCY: ICD-10-CM

## 2024-03-07 DIAGNOSIS — Z09 POSTOPERATIVE EXAMINATION: ICD-10-CM

## 2024-03-07 LAB
BILIRUBIN, POC: NEGATIVE
BLOOD URINE, POC: NEGATIVE
CLARITY, POC: NORMAL
COLOR, POC: NORMAL
GLUCOSE URINE, POC: NEGATIVE
KETONES, POC: NEGATIVE
LEUKOCYTE EST, POC: NORMAL
NITRITE, POC: NORMAL
PH, POC: 7.5
PROTEIN, POC: NEGATIVE
SPECIFIC GRAVITY, POC: 1.02
UROBILINOGEN, POC: 0.2

## 2024-03-07 PROCEDURE — 81002 URINALYSIS NONAUTO W/O SCOPE: CPT

## 2024-03-07 PROCEDURE — 99024 POSTOP FOLLOW-UP VISIT: CPT

## 2024-03-07 NOTE — PROGRESS NOTES
Postop Progress Note    Subjective    Desiree Garcia presents to the office for postop follow up.  Chief Complaint   Patient presents with    Post-Op Check     Patient had TLH/Robert. Salpingectomy on 2-.  Patient reports she is doing well this week. Last week was rough.  She has noticed a cramping in lower pelvis the last day or 2. She has noticed some urinary frequency.  She had some dysuria.      Objective    Vitals:    03/07/24 1022   BP: 112/74     General: alert, cooperative and no distress  Incision: healing well    Assessment  Doing well postoperatively.  Reports occasional lower pelvic cramping and urinary frequency.  Dysuria has resolved at this point - was more of an issue in first few days after surgery.  She denies vaginal bleeding, discharge.  Aware of benign pathology.  Doing well with restrictions.  Abdominal incisions healing well.     Plan  1. Continue any current medications  2. Wound care discussed  3. Pt is to continue with activity restrictions   4. Usual diet  5. Follow up:  4 weeks    Electronically signed by Valeri Knight PA-C on 3/7/2024 at 10:47 AM

## 2024-04-10 ENCOUNTER — OFFICE VISIT (OUTPATIENT)
Dept: OBGYN CLINIC | Age: 39
End: 2024-04-10

## 2024-04-10 VITALS — DIASTOLIC BLOOD PRESSURE: 70 MMHG | SYSTOLIC BLOOD PRESSURE: 106 MMHG | BODY MASS INDEX: 32.28 KG/M2 | WEIGHT: 194 LBS

## 2024-04-10 DIAGNOSIS — Z09 POSTOPERATIVE EXAMINATION: Primary | ICD-10-CM

## 2024-04-10 PROCEDURE — 99024 POSTOP FOLLOW-UP VISIT: CPT | Performed by: OBSTETRICS & GYNECOLOGY

## 2024-04-10 NOTE — PROGRESS NOTES
Postop Progress Note    Subjective    Desiree Garcia presents to the office for postop follow up.    Chief Complaint   Patient presents with    Post-Op Check     Patient had TLH, julianne. Salpingectomy on 2-. Patient just had a round of 5 iron infusions. Bariatric Dr. Elli Stoner ordered those infusions and she has order to repeat labs in June.          Objective    Vitals:    04/10/24 1024   BP: 106/70     General: alert, cooperative and no distress  Incision: healing well, cuff well supported and intact    Assessment  Doing well postoperatively.    Plan  1. Continue any current medications  2. Wound care discussed  3. Pt is to increase activities as tolerated after 8 weeks  4. Usual diet  5. Follow up: as needed    Electronically signed by Velia Machuca DO on 4/10/2024 at 10:43 AM

## (undated) DEVICE — ARM DRAPE

## (undated) DEVICE — SEAL

## (undated) DEVICE — SOLUTION IV 1000ML 0.9% SOD CHL FOR IRRIG PLAS CONT

## (undated) DEVICE — Device: Brand: UTERINE ELEVATOR PRO

## (undated) DEVICE — ELECTRO LUBE IS A SINGLE PATIENT USE DEVICE THAT IS INTENDED TO BE USED ON ELECTROSURGICAL ELECTRODES TO REDUCE STICKING.: Brand: KEY SURGICAL ELECTRO LUBE

## (undated) DEVICE — C-ARM: Brand: UNBRANDED

## (undated) DEVICE — GOWN,SIRUS,POLYRNF,BRTHSLV,LG,30/CS: Brand: MEDLINE

## (undated) DEVICE — SUTURE MCRYL SZ 4-0 L27IN ABSRB UD L19MM PS-2 1/2 CIR PRIM Y426H

## (undated) DEVICE — Device

## (undated) DEVICE — ENDOSCOPIC KIT CLN SWAB MICROFIBER CLTH SCP CLEANOR DISP

## (undated) DEVICE — SOLUTION IRRIG 1000ML 0.9% SOD CHL USP POUR PLAS BTL

## (undated) DEVICE — BLADELESS OBTURATOR: Brand: WECK VISTA

## (undated) DEVICE — TIP COVER ACCESSORY

## (undated) DEVICE — MTHZ GYN LAPAROSCOPY: Brand: MEDLINE INDUSTRIES, INC.

## (undated) DEVICE — INSUFFLATION NEEDLE TO ESTABLISH PNEUMOPERITONEUM.: Brand: INSUFFLATION NEEDLE

## (undated) DEVICE — GLOVE SURG SZ 65 CRM LTX FREE POLYISOPRENE POLYMER BEAD ANTI

## (undated) DEVICE — SUTURE V-LOC 180 SZ 2-0 L9IN ABSRB GRN L27MM GS-22 1/2 CIR VLOCL2145

## (undated) DEVICE — PAD PT POS 36 IN SURGYPAD DISP

## (undated) DEVICE — PEN SURG MINI SKIN MRK FN TIP W/ RUL W/OUT LBL TRAD PREP